# Patient Record
Sex: FEMALE | Race: WHITE | URBAN - NONMETROPOLITAN AREA
[De-identification: names, ages, dates, MRNs, and addresses within clinical notes are randomized per-mention and may not be internally consistent; named-entity substitution may affect disease eponyms.]

---

## 2019-02-13 ENCOUNTER — IMPORTED ENCOUNTER (OUTPATIENT)
Dept: URBAN - NONMETROPOLITAN AREA CLINIC 1 | Facility: CLINIC | Age: 79
End: 2019-02-13

## 2019-02-13 PROBLEM — H16.223: Noted: 2019-02-13

## 2019-02-13 PROBLEM — H25.813: Noted: 2019-02-13

## 2019-02-13 PROCEDURE — 99213 OFFICE O/P EST LOW 20 MIN: CPT

## 2019-02-13 NOTE — PATIENT DISCUSSION
Cataract(s) OU-Visually significant.-Cataract(s) causing symptomatic impairment of visual function not correctable with a tolerable change in glasses or contact lenses lighting or non-operative means resulting in specific activity limitations and/or participation restrictions including but not limited to reading viewing television driving or meeting vocational or recreational needs. -Expectation is clearer vision and reduced glare disability after cataract removal.-Refer to Dr Cristie Fothergill for cataract evaluationDry Eye Syndrome OU- Continue current management.- Recommended artificial tears to use as directed. - Discussed lid hygiene warm compress and eyelid wash.

## 2019-03-15 ENCOUNTER — IMPORTED ENCOUNTER (OUTPATIENT)
Dept: URBAN - NONMETROPOLITAN AREA CLINIC 1 | Facility: CLINIC | Age: 79
End: 2019-03-15

## 2019-03-15 PROBLEM — H16.223: Noted: 2019-03-15

## 2019-03-15 PROBLEM — H25.813: Noted: 2019-03-15

## 2019-03-15 PROCEDURE — 92014 COMPRE OPH EXAM EST PT 1/>: CPT

## 2019-03-15 NOTE — PATIENT DISCUSSION
Cataract OU-Visually significant cataract OU.-Cataract(s) causing symptomatic impairment of visual function not correctable with a tolerable change in glasses or contact lenses lighting or non-operative means resulting in specific activity limitations and/or participation restrictions including but not limited to reading viewing television driving or meeting vocational or recreational needs. -Expectation is clearer vision and functional improvement in symptoms as well as reduced glare disability after cataract removal.-Order IOLMaster and OPD today. -Recommend standard/traditional based on today's OPD testing and lifestyle questionnaire.-All questions were answered regarding surgery including pre and post-op medications appointments activity restrictions and anesthetic usage.-The risks benefits and alternatives and special risk factors for the patient were discussed in detail including but not limited to: bleeding infection retinal detachment vitreous loss problems with the implant and possible need for additional surgery.-Although rare the possibility of complete vision loss was discussed.-The possible need for glasses post-operatively was discussed.-Order H&P-Patient elects to proceed with cataract surgery OS. Will schedule at patient's convenience and re-evaluate OD in the future. Dry Eye Syndrome OU- Continue current management.- Recommended artificial tears to use as directed. - Discussed lid hygiene warm compress and eyelid wash.

## 2019-04-11 ENCOUNTER — IMPORTED ENCOUNTER (OUTPATIENT)
Dept: URBAN - NONMETROPOLITAN AREA CLINIC 1 | Facility: CLINIC | Age: 79
End: 2019-04-11

## 2019-04-11 PROBLEM — H16.223: Noted: 2019-04-11

## 2019-04-11 PROBLEM — H25.813: Noted: 2019-04-11

## 2019-05-02 ENCOUNTER — IMPORTED ENCOUNTER (OUTPATIENT)
Dept: URBAN - NONMETROPOLITAN AREA CLINIC 1 | Facility: CLINIC | Age: 79
End: 2019-05-02

## 2019-05-02 PROBLEM — I10: Noted: 2019-05-02

## 2019-05-02 PROBLEM — F41.1: Noted: 2019-05-02

## 2019-05-02 PROBLEM — Z01.818: Noted: 2019-05-02

## 2019-05-02 PROBLEM — F32.9: Noted: 2019-05-02

## 2019-05-16 ENCOUNTER — IMPORTED ENCOUNTER (OUTPATIENT)
Dept: URBAN - NONMETROPOLITAN AREA CLINIC 1 | Facility: CLINIC | Age: 79
End: 2019-05-16

## 2019-05-16 PROBLEM — Z98.42: Noted: 2019-05-16

## 2019-05-16 PROCEDURE — 66982 XCAPSL CTRC RMVL CPLX WO ECP: CPT

## 2019-05-16 PROCEDURE — 92136 OPHTHALMIC BIOMETRY: CPT

## 2019-05-16 PROCEDURE — 99024 POSTOP FOLLOW-UP VISIT: CPT

## 2019-05-16 NOTE — PATIENT DISCUSSION
Same Day s/p PCIOL OS*****Due to trace amounts of blood in A/C recommend patient return tomorrow to see Dr. Magda Lewis for confirm blood has resolved. -Pt doing well s/p PCIOL. -Continue post-op gtts according to instruction sheet and sleep with eye shield over eye for 7 nights.-Avoid bending at the waist lifting anything over 5lbs and dirty or joão environments. -RTC in 1 day.

## 2019-05-17 ENCOUNTER — IMPORTED ENCOUNTER (OUTPATIENT)
Dept: URBAN - NONMETROPOLITAN AREA CLINIC 1 | Facility: CLINIC | Age: 79
End: 2019-05-17

## 2019-05-17 PROCEDURE — 99024 POSTOP FOLLOW-UP VISIT: CPT

## 2019-05-17 NOTE — PATIENT DISCUSSION
1 day POV OS - AC is deep (-) heme and trace cells - Continue all post op gtts as directed - Start Marzena 128 QID OS Rx sent to pharmacy - Start Combigan BID OS Sample given in office.

## 2019-05-23 ENCOUNTER — IMPORTED ENCOUNTER (OUTPATIENT)
Dept: URBAN - NONMETROPOLITAN AREA CLINIC 1 | Facility: CLINIC | Age: 79
End: 2019-05-23

## 2019-05-23 PROBLEM — F32.9: Noted: 2019-05-23

## 2019-05-23 PROBLEM — F41.1: Noted: 2019-05-23

## 2019-05-23 PROBLEM — Z98.42: Noted: 2019-05-23

## 2019-05-23 PROBLEM — I10: Noted: 2019-05-23

## 2019-05-23 PROBLEM — H25.11: Noted: 2019-05-23

## 2019-05-23 PROBLEM — Z01.818: Noted: 2019-05-23

## 2019-05-23 PROCEDURE — 92012 INTRM OPH EXAM EST PATIENT: CPT

## 2019-05-23 NOTE — PATIENT DISCUSSION
Cataract(s)-Visually significant cataract OD . -Cataract(s) causing symptomatic impairment of visual function not correctable with a tolerable change in glasses or contact lenses lighting or non-operative means resulting in specific activity limitations and/or participation restrictions including but not limited to reading viewing television driving or meeting vocational or recreational needs. -Expectation is clearer vision and functional improvement in symptoms as well as reduced glare disability after cataract removal.-Recommend Standard/Traditional based on previous OPD testing and lifestyle questionnaire.-All questions were answered regarding surgery including pre and post-op medications appointments activity restrictions and anesthetic usage.-The risks benefits and alternatives and special risk factors for the patient were discussed in detail including but not limited to: bleeding infection retinal detachment vitreous loss problems with the implant and possible need for additional surgery.-Although rare the possibility of complete vision loss was discussed.-The need for glasses post-operatively was discussed.-Patient elects to proceed with cataract surgery OD . Will schedule at patient's convenience. s/p PCIOL-Pt doing well at 1 week s/p PCIOL. -Continue post-op gtts according to instruction sheet.-Okay to resume usual activites and d/c eye shield.

## 2019-06-06 ENCOUNTER — IMPORTED ENCOUNTER (OUTPATIENT)
Dept: URBAN - NONMETROPOLITAN AREA CLINIC 1 | Facility: CLINIC | Age: 79
End: 2019-06-06

## 2019-06-06 PROBLEM — F32.9: Noted: 2019-05-23

## 2019-06-06 PROBLEM — F41.1: Noted: 2019-05-23

## 2019-06-06 PROBLEM — Z98.41: Noted: 2019-06-06

## 2019-06-06 PROBLEM — I10: Noted: 2019-05-23

## 2019-06-06 PROBLEM — Z98.42: Noted: 2019-06-13

## 2019-06-06 PROCEDURE — 66984 XCAPSL CTRC RMVL W/O ECP: CPT

## 2019-06-06 PROCEDURE — 92136 OPHTHALMIC BIOMETRY: CPT

## 2019-06-06 NOTE — PATIENT DISCUSSION
CE OD Standard - The pt has undergone successful cataract extraction with Intraocular lens implantation in the right eye.- PO examination is normal and visual acuity has improved. - The pt has been instructed to call the office immediately if there is increased redness pain or vision loss. - Instructed patient not to rub the eye and don’t go swimming. - Pt should return in 1 week for follow up. - Ocular meds plan discussed and patient received printed take home instructions.

## 2019-06-13 ENCOUNTER — IMPORTED ENCOUNTER (OUTPATIENT)
Dept: URBAN - NONMETROPOLITAN AREA CLINIC 1 | Facility: CLINIC | Age: 79
End: 2019-06-13

## 2019-06-13 PROCEDURE — 99024 POSTOP FOLLOW-UP VISIT: CPT

## 2019-06-13 NOTE — PATIENT DISCUSSION
1 week s/p PCIOL OD-Pt doing well at 1 week s/p PCIOL. -Continue post-op gtts according to instruction sheet.-Okay to resume usual activites and d/c eye shield. 1 month s/p PCIOL OS-Stable PCIOL OS.-Monitor for PCO.

## 2019-07-16 ENCOUNTER — IMPORTED ENCOUNTER (OUTPATIENT)
Dept: URBAN - NONMETROPOLITAN AREA CLINIC 1 | Facility: CLINIC | Age: 79
End: 2019-07-16

## 2019-07-16 PROBLEM — Z98.42: Noted: 2019-06-13

## 2019-07-16 PROBLEM — Z98.41: Noted: 2019-06-06

## 2019-07-16 PROBLEM — H52.13: Noted: 2019-07-16

## 2019-07-16 PROBLEM — H52.221: Noted: 2019-07-16

## 2019-07-16 PROCEDURE — 99024 POSTOP FOLLOW-UP VISIT: CPT

## 2019-07-16 PROCEDURE — 92015 DETERMINE REFRACTIVE STATE: CPT

## 2019-07-16 NOTE — PATIENT DISCUSSION
s/p PCIOL OU-Stable PCIOL OU.-Monitor for PCO OU.-RTC in 6 months for DFEUpdated spec Rx given. Recommend lens that will provide comfort as well as protect safety and health of eyes.

## 2022-04-09 ASSESSMENT — VISUAL ACUITY
OS_PH: 20/50
OS_CC: 20/200
OS_CC: 20/60-
OS_SC: 20/50-
OD_CC: 20/60-
OD_PH: 20/30-
OS_CC: 20/70
OS_CC: 20/60-
OS_AM: 20/30
OS_CC: 20/60
OD_PH: 20/50
OS_SC: 20/50-
OS_PH: 20/30-
OD_SC: 20/50-2
OS_CC: 20/60+
OD_GLARE: 20/80
OD_CC: 20/60-
OD_CC: 20/40
OS_AM: 20/30
OS_PH: 20/40
OD_PAM: 20/30
OD_PH: 20/40
OS_CC: J3
OD_SC: 20/50-2
OD_CC: J3
OS_CC: 20/30-
OS_PH: 20/40
OD_GLARE: 20/80
OS_PH: 20/40+
OD_PH: 20/40
OD_PAM: 20/30
OS_PH: 20/50
OD_CC: 20/40
OD_PH: 20/50
OS_GLARE: 20/80
OS_GLARE: 20/80
OD_PAM: 20/30
OD_PH: 20/50-
OD_GLARE: 20/80
OD_PH: 20/40-
OD_CC: 20/60

## 2022-04-09 ASSESSMENT — TONOMETRY
OS_IOP_MMHG: 20
OD_IOP_MMHG: 10
OD_IOP_MMHG: 19
OD_IOP_MMHG: 19
OD_IOP_MMHG: 18
OS_IOP_MMHG: 16
OD_IOP_MMHG: 20
OS_IOP_MMHG: 37
OS_IOP_MMHG: 09
OS_IOP_MMHG: 20
OD_IOP_MMHG: 18
OD_IOP_MMHG: 14
OD_IOP_MMHG: 18
OS_IOP_MMHG: 18